# Patient Record
Sex: FEMALE | Race: WHITE | NOT HISPANIC OR LATINO
[De-identification: names, ages, dates, MRNs, and addresses within clinical notes are randomized per-mention and may not be internally consistent; named-entity substitution may affect disease eponyms.]

---

## 2018-12-31 ENCOUNTER — RESULT REVIEW (OUTPATIENT)
Age: 23
End: 2018-12-31

## 2019-08-22 PROBLEM — Z00.00 ENCOUNTER FOR PREVENTIVE HEALTH EXAMINATION: Status: ACTIVE | Noted: 2019-08-22

## 2019-09-25 ENCOUNTER — APPOINTMENT (OUTPATIENT)
Dept: INFECTIOUS DISEASE | Facility: CLINIC | Age: 24
End: 2019-09-25

## 2020-02-26 ENCOUNTER — RESULT REVIEW (OUTPATIENT)
Age: 25
End: 2020-02-26

## 2020-04-01 ENCOUNTER — RESULT REVIEW (OUTPATIENT)
Age: 25
End: 2020-04-01

## 2021-07-21 ENCOUNTER — RESULT REVIEW (OUTPATIENT)
Age: 26
End: 2021-07-21

## 2022-01-13 ENCOUNTER — TRANSCRIPTION ENCOUNTER (OUTPATIENT)
Age: 27
End: 2022-01-13

## 2022-01-20 ENCOUNTER — TRANSCRIPTION ENCOUNTER (OUTPATIENT)
Age: 27
End: 2022-01-20

## 2022-06-22 ENCOUNTER — NON-APPOINTMENT (OUTPATIENT)
Age: 27
End: 2022-06-22

## 2022-06-28 ENCOUNTER — NON-APPOINTMENT (OUTPATIENT)
Age: 27
End: 2022-06-28

## 2022-07-18 ENCOUNTER — RESULT REVIEW (OUTPATIENT)
Age: 27
End: 2022-07-18

## 2022-07-28 ENCOUNTER — APPOINTMENT (OUTPATIENT)
Dept: ORTHOPEDIC SURGERY | Facility: CLINIC | Age: 27
End: 2022-07-28

## 2022-07-28 VITALS — WEIGHT: 155 LBS | BODY MASS INDEX: 29.27 KG/M2 | HEIGHT: 61 IN

## 2022-07-28 DIAGNOSIS — Z78.9 OTHER SPECIFIED HEALTH STATUS: ICD-10-CM

## 2022-07-28 DIAGNOSIS — Z83.3 FAMILY HISTORY OF DIABETES MELLITUS: ICD-10-CM

## 2022-07-28 DIAGNOSIS — Z80.9 FAMILY HISTORY OF MALIGNANT NEOPLASM, UNSPECIFIED: ICD-10-CM

## 2022-07-28 DIAGNOSIS — Z82.49 FAMILY HISTORY OF ISCHEMIC HEART DISEASE AND OTHER DISEASES OF THE CIRCULATORY SYSTEM: ICD-10-CM

## 2022-07-28 DIAGNOSIS — M23.91 UNSPECIFIED INTERNAL DERANGEMENT OF RIGHT KNEE: ICD-10-CM

## 2022-07-28 DIAGNOSIS — S89.91XA UNSPECIFIED INJURY OF RIGHT LOWER LEG, INITIAL ENCOUNTER: ICD-10-CM

## 2022-07-28 PROCEDURE — 99204 OFFICE O/P NEW MOD 45 MIN: CPT

## 2022-07-28 PROCEDURE — 73564 X-RAY EXAM KNEE 4 OR MORE: CPT | Mod: RT

## 2022-07-28 NOTE — HISTORY OF PRESENT ILLNESS
[Right Leg] : right leg [10] : 10 [8] : 8 [Dull/Aching] : dull/aching [Tightness] : tightness [Constant] : constant [Leisure] : leisure [Rest] : rest [Walking] : walking [de-identified] : DOI 7/27/22\par 7/28/22: pt was playing basketball and landed on the right leg wrong. Ambulating with crutches. Here with sister. +ice use and Tylenol. Denies prior knee issues.  [] : Post Surgical Visit: no [FreeTextEntry1] : right knee  [FreeTextEntry5] : pt was playing basketball and landed on the right leg wrong  [FreeTextEntry7] : down the leg

## 2022-07-28 NOTE — PHYSICAL EXAM
[Normal Coordination] : normal coordination [Normal Mood and Affect] : normal mood and affect [Orientated] : orientated [Able to Communicate] : able to communicate [Normal Skin] : normal skin [No obvious lymphadenopathy in areas examined] : no obvious lymphadenopathy in areas examined [Well Developed] : well developed [Well Nourished] : well nourished [Right] : right knee [] : patient ambulates with assistive device [FreeTextEntry8] : Guarded [de-identified] : equivocal SLR [de-identified] : Not assessed

## 2022-07-28 NOTE — DISCUSSION/SUMMARY
[de-identified] : The documentation recorded by the scribe accurately reflects the service I personally performed and the decisions made by me.\par I, Omid Mcgarry, attest that this documentation has been prepared under the direction and in the presence of Provider Terrance Brock MD.\par \par The patient was seen by Terrance Brock MD\par

## 2022-07-28 NOTE — ASSESSMENT
[FreeTextEntry1] : Underlying pathology reviewed and treatment options discussed. \par Stat Obtain MRI right knee R/O tear vs fracture. \par 07/28/2022 : xrays R knee negative. \par Apply ice to affected area.\par OTC nsaids. \par Discussed brace. \par Questions addressed.\par

## 2022-07-28 NOTE — REASON FOR VISIT
[Family Member] : family member [Other: _____] : [unfilled] [FreeTextEntry2] : 7.28.22 New Patient came in for right knee pain , injured 7/27/22 playing Basketball

## 2022-08-01 ENCOUNTER — APPOINTMENT (OUTPATIENT)
Dept: ORTHOPEDIC SURGERY | Facility: CLINIC | Age: 27
End: 2022-08-01

## 2022-08-01 ENCOUNTER — TRANSCRIPTION ENCOUNTER (OUTPATIENT)
Age: 27
End: 2022-08-01

## 2022-08-01 PROCEDURE — 99214 OFFICE O/P EST MOD 30 MIN: CPT | Mod: 57

## 2022-08-01 NOTE — IMAGING
[de-identified] : Examination of the [] knee is as follows: \par Inspection: effusion, moderate effusion. \par Palpation: lateral joint line tenderness. \par ROM: flexion 90 degrees, extension 3 degrees, guarding during exam. \par Testing: positive Lachmann, positive anterior draw, but negative Varus instability, negative Valgus instability, negative Patella apprehension. \par Neuro: light touch is intact throughout. \par Gait: mildly antalgic.\par

## 2022-08-01 NOTE — ASSESSMENT
[FreeTextEntry1] : ACL TEAR FROM BASKETBALL 5 DAYS AGO\par REVIEWED MRI AND NOTES FROM [facility]\par VERY ACTICE, R/B/A TO ACL BTB AUTOGRAFT REVIEWED\par POSTOP COURSE OUTLINED\par ALREAYD OFF OCP, START PT\par \par The risks and benefits of surgery have been discussed. Risks include but are not limited to bleeding, infection, reaction to anesthesia, injury to blood vessels and nerves, malunion, nonunion, DVT, PE, necessity of repeat surgery, chronic pain, loss of limb and death. The patient understands the risks and agrees with the surgical plan. All questions have been answered.\par \par \par

## 2022-08-01 NOTE — HISTORY OF PRESENT ILLNESS
[Right Leg] : right leg [Gradual] : gradual [2] : 2 [1] : 2 [Localized] : localized [Intermittent] : intermittent [Nothing helps with pain getting better] : Nothing helps with pain getting better [Bending forward] : bending forward [de-identified] : pt presents here today  with right knee pain \par pt was playing basketball and hear and felt a pop on the right knee\par limited ROM [] : no [FreeTextEntry1] : knee [de-identified] : dR Mcmahon [de-identified] : X RAYS DONE AT [facility] MRI DONE AT [facility]  [de-identified] : NOTHING

## 2022-08-02 ENCOUNTER — APPOINTMENT (OUTPATIENT)
Dept: ORTHOPEDIC SURGERY | Facility: CLINIC | Age: 27
End: 2022-08-02

## 2022-08-04 ENCOUNTER — APPOINTMENT (OUTPATIENT)
Dept: ORTHOPEDIC SURGERY | Facility: CLINIC | Age: 27
End: 2022-08-04

## 2022-08-26 ENCOUNTER — LABORATORY RESULT (OUTPATIENT)
Age: 27
End: 2022-08-26

## 2022-08-30 ENCOUNTER — APPOINTMENT (OUTPATIENT)
Age: 27
End: 2022-08-30
Payer: COMMERCIAL

## 2022-08-30 PROCEDURE — 29888 ARTHRS AID ACL RPR/AGMNTJ: CPT | Mod: AS,RT

## 2022-08-30 PROCEDURE — 20922 REMOVAL OF FASCIA FOR GRAFT: CPT | Mod: AS,59,RT

## 2022-08-30 PROCEDURE — 29888 ARTHRS AID ACL RPR/AGMNTJ: CPT | Mod: RT

## 2022-08-30 PROCEDURE — 20902 REMOVAL OF BONE FOR GRAFT: CPT | Mod: AS,59,RT

## 2022-08-30 PROCEDURE — 20922 REMOVAL OF FASCIA FOR GRAFT: CPT | Mod: 59,RT

## 2022-08-30 PROCEDURE — 20902 REMOVAL OF BONE FOR GRAFT: CPT | Mod: 59,RT

## 2022-08-30 RX ORDER — ASPIRIN/ACETAMINOPHEN/CAFFEINE 500-325-65
325 POWDER IN PACKET (EA) ORAL
Qty: 28 | Refills: 0 | Status: ACTIVE | COMMUNITY
Start: 2022-08-30 | End: 1900-01-01

## 2022-08-30 RX ORDER — OXYCODONE AND ACETAMINOPHEN 5; 325 MG/1; MG/1
5-325 TABLET ORAL
Qty: 42 | Refills: 0 | Status: ACTIVE | COMMUNITY
Start: 2022-08-30 | End: 1900-01-01

## 2022-09-02 ENCOUNTER — APPOINTMENT (OUTPATIENT)
Dept: ORTHOPEDIC SURGERY | Facility: CLINIC | Age: 27
End: 2022-09-02

## 2022-09-02 VITALS — BODY MASS INDEX: 31.72 KG/M2 | HEIGHT: 61 IN | WEIGHT: 168 LBS

## 2022-09-02 PROCEDURE — 99024 POSTOP FOLLOW-UP VISIT: CPT

## 2022-09-02 NOTE — HISTORY OF PRESENT ILLNESS
[Right Leg] : right leg [Gradual] : gradual [4] : 4 [5] : 5 [Localized] : localized [Intermittent] : intermittent [Nothing helps with pain getting better] : Nothing helps with pain getting better [Bending forward] : bending forward [de-identified] : pt presents here today  with right knee pain \par pt was playing basketball and hear and felt a pop on the right knee\par limited ROM\par \par  [] : no [FreeTextEntry1] : knee [FreeTextEntry7] : KNEE TO CALF [de-identified] : dR Mcmahon [de-identified] : X RAYS DONE AT [facility] MRI DONE AT [facility]  [de-identified] : NOTHING

## 2022-10-07 ENCOUNTER — APPOINTMENT (OUTPATIENT)
Dept: ORTHOPEDIC SURGERY | Facility: CLINIC | Age: 27
End: 2022-10-07

## 2022-10-07 VITALS — BODY MASS INDEX: 29.27 KG/M2 | WEIGHT: 155 LBS | HEIGHT: 61 IN

## 2022-10-07 PROCEDURE — 99024 POSTOP FOLLOW-UP VISIT: CPT

## 2022-10-07 NOTE — IMAGING
[de-identified] : HEALED INCISION\par 0-90\par MILDLY ANTALGIC GAIT\par STABLE LACHMAN AND AD\par NEGATIVE MOY

## 2022-10-07 NOTE — HISTORY OF PRESENT ILLNESS
[Right Leg] : right leg [Gradual] : gradual [Intermittent] : intermittent [Nothing helps with pain getting better] : Nothing helps with pain getting better [Bending forward] : bending forward [1] : 2 [0] : 0 [Dull/Aching] : dull/aching [] : Post Surgical Visit: no [FreeTextEntry1] : RT knee  [FreeTextEntry5] : Pt states she has no pain. States PT is helping with pain.  [de-identified] : X RAYS DONE AT [facility] MRI DONE AT [facility]  [de-identified] : NOTHING

## 2022-10-07 NOTE — ASSESSMENT
[FreeTextEntry1] : PROGRESSING WELL\par CONTINUE PT \par PLAN ON RTW (TEACHER) IN THE NEXT 4-6 WEEKS WITH ACCOMMODATIONS\par

## 2022-11-03 ENCOUNTER — NON-APPOINTMENT (OUTPATIENT)
Age: 27
End: 2022-11-03

## 2022-11-07 ENCOUNTER — APPOINTMENT (OUTPATIENT)
Dept: ORTHOPEDIC SURGERY | Facility: CLINIC | Age: 27
End: 2022-11-07

## 2022-11-07 DIAGNOSIS — S83.511D SPRAIN OF ANTERIOR CRUCIATE LIGAMENT OF RIGHT KNEE, SUBSEQUENT ENCOUNTER: ICD-10-CM

## 2022-11-07 PROCEDURE — 99024 POSTOP FOLLOW-UP VISIT: CPT

## 2022-11-07 NOTE — HISTORY OF PRESENT ILLNESS
[Right Leg] : right leg [Gradual] : gradual [1] : 2 [0] : 0 [Dull/Aching] : dull/aching [Intermittent] : intermittent [Physical therapy] : physical therapy [Nothing helps with pain getting better] : Nothing helps with pain getting better [Bending forward] : bending forward [de-identified] : DOS 8/30/22l: RIGHT KNEE ACL RECONSTRUCTION \par \par 11/7/22 pt is here for a follow up s/p right knee acl  ,having some  tightness,cont physical therapy 3 times at week [] : Post Surgical Visit: no [FreeTextEntry1] : RT knee  [FreeTextEntry5] : Pt states she has no pain. States PT is helping with pain.  [de-identified] : X RAYS DONE AT [facility] MRI DONE AT [facility]  [de-identified] : physical therapy

## 2022-11-21 ENCOUNTER — APPOINTMENT (OUTPATIENT)
Dept: ORTHOPEDIC SURGERY | Facility: CLINIC | Age: 27
End: 2022-11-21

## 2022-12-22 ENCOUNTER — APPOINTMENT (OUTPATIENT)
Dept: ORTHOPEDIC SURGERY | Facility: CLINIC | Age: 27
End: 2022-12-22